# Patient Record
Sex: FEMALE | Race: WHITE | Employment: PART TIME | URBAN - METROPOLITAN AREA
[De-identification: names, ages, dates, MRNs, and addresses within clinical notes are randomized per-mention and may not be internally consistent; named-entity substitution may affect disease eponyms.]

---

## 2017-09-15 ENCOUNTER — HOSPITAL ENCOUNTER (OUTPATIENT)
Dept: MAMMOGRAPHY | Age: 42
Discharge: HOME | End: 2017-09-15
Attending: SPECIALIST
Payer: COMMERCIAL

## 2017-09-15 DIAGNOSIS — Z12.31 VISIT FOR SCREENING MAMMOGRAM: ICD-10-CM

## 2017-09-15 PROCEDURE — 77067 SCR MAMMO BI INCL CAD: CPT

## 2018-11-07 ENCOUNTER — HOSPITAL ENCOUNTER (OUTPATIENT)
Dept: MAMMOGRAPHY | Age: 43
Discharge: HOME | End: 2018-11-07
Attending: SPECIALIST
Payer: COMMERCIAL

## 2018-11-07 DIAGNOSIS — Z12.39 BREAST SCREENING: ICD-10-CM

## 2018-11-07 PROCEDURE — 77063 BREAST TOMOSYNTHESIS BI: CPT

## 2020-01-09 ENCOUNTER — HOSPITAL ENCOUNTER (OUTPATIENT)
Dept: MAMMOGRAPHY | Age: 45
Discharge: HOME | End: 2020-01-09
Attending: SPECIALIST
Payer: COMMERCIAL

## 2020-01-09 DIAGNOSIS — Z12.31 VISIT FOR SCREENING MAMMOGRAM: ICD-10-CM

## 2020-01-09 PROCEDURE — 77063 BREAST TOMOSYNTHESIS BI: CPT

## 2020-01-10 ENCOUNTER — HOSPITAL ENCOUNTER (OUTPATIENT)
Dept: MAMMOGRAPHY | Age: 45
Discharge: HOME | End: 2020-01-10
Attending: SPECIALIST
Payer: COMMERCIAL

## 2020-01-10 DIAGNOSIS — R92.8 ABNORMAL MAMMOGRAM OF LEFT BREAST: ICD-10-CM

## 2020-01-10 PROCEDURE — 76642 ULTRASOUND BREAST LIMITED: CPT

## 2021-01-08 ENCOUNTER — TRANSCRIBE ORDER (OUTPATIENT)
Dept: SCHEDULING | Age: 46
End: 2021-01-08

## 2021-01-08 DIAGNOSIS — Z12.31 VISIT FOR SCREENING MAMMOGRAM: Primary | ICD-10-CM

## 2021-02-05 ENCOUNTER — HOSPITAL ENCOUNTER (OUTPATIENT)
Dept: MAMMOGRAPHY | Age: 46
Discharge: HOME | End: 2021-02-05
Attending: SPECIALIST
Payer: COMMERCIAL

## 2021-02-05 DIAGNOSIS — Z12.31 VISIT FOR SCREENING MAMMOGRAM: ICD-10-CM

## 2021-02-05 PROCEDURE — 77063 BREAST TOMOSYNTHESIS BI: CPT

## 2022-02-07 ENCOUNTER — TRANSCRIBE ORDER (OUTPATIENT)
Dept: SCHEDULING | Age: 47
End: 2022-02-07

## 2022-02-07 DIAGNOSIS — Z12.31 VISIT FOR SCREENING MAMMOGRAM: Primary | ICD-10-CM

## 2022-03-24 ENCOUNTER — HOSPITAL ENCOUNTER (OUTPATIENT)
Dept: MAMMOGRAPHY | Age: 47
Discharge: HOME | End: 2022-03-24
Attending: SPECIALIST
Payer: COMMERCIAL

## 2022-03-24 DIAGNOSIS — Z12.31 VISIT FOR SCREENING MAMMOGRAM: ICD-10-CM

## 2022-03-24 PROCEDURE — 77063 BREAST TOMOSYNTHESIS BI: CPT

## 2023-01-30 ENCOUNTER — TRANSCRIBE ORDER (OUTPATIENT)
Dept: SCHEDULING | Age: 48
End: 2023-01-30

## 2023-01-30 DIAGNOSIS — Z12.31 SCREENING MAMMOGRAM FOR HIGH-RISK PATIENT: Primary | ICD-10-CM

## 2023-02-02 ENCOUNTER — OFFICE VISIT (OUTPATIENT)
Dept: ORTHOPEDIC SURGERY | Age: 48
End: 2023-02-02
Payer: COMMERCIAL

## 2023-02-02 VITALS — BODY MASS INDEX: 21.98 KG/M2 | HEIGHT: 68 IN | WEIGHT: 145 LBS

## 2023-02-02 DIAGNOSIS — M47.816 LUMBAR SPONDYLOSIS: ICD-10-CM

## 2023-02-02 DIAGNOSIS — M54.50 ACUTE RIGHT-SIDED LOW BACK PAIN WITHOUT SCIATICA: Primary | ICD-10-CM

## 2023-02-02 DIAGNOSIS — M51.36 BULGE OF LUMBAR DISC WITHOUT MYELOPATHY: ICD-10-CM

## 2023-02-02 RX ORDER — DIAZEPAM 5 MG/1
5 TABLET ORAL AS NEEDED
Qty: 2 TABLET | Refills: 0 | Status: SHIPPED | OUTPATIENT
Start: 2023-02-02

## 2023-02-02 NOTE — PROGRESS NOTES
Suzan Allen (: 1975) is a 50 y.o. female, patient, here for evaluation of the following chief complaint(s):  LOW BACK PAIN       ASSESSMENT/PLAN:    Below is the assessment and plan developed based on review of pertinent history, physical exam, labs, studies, and medications. Have discussed the patient's diagnosis and radiographic findings at length and have answered all patient questions to her satisfaction. Have sent a prescription for Valium electronically to the patient's preferred pharmacy. Have advised the patient to continue home exercise program of core strengthening as taught to her by outpatient physical therapy. Given the patient's ongoing pain despite conservative management have referred the patient for MRI. Will plan on seeing the patient back for reevaluation and further treatment recommendations once imaging results are available for review. The patient understands and agrees to the treatment plan as outlined above. 1. Acute right-sided low back pain without sciatica  -     XR SPINE LUMB 2 OR 3 V; Future  -     diazePAM (VALIUM) 5 mg tablet; Take 1 Tablet by mouth as needed for Anxiety. Max Daily Amount: 480 mg. Take 1 tablet 60 minutes prior to procedure. Take 1 tablet 30 minutes prior to procedure if needed for anxiety. Indications: anxious, Normal, Disp-2 Tablet, R-0Supervising physician: Michael Jeffrey MD [id number]  2. Bulge of lumbar disc without myelopathy  -     diazePAM (VALIUM) 5 mg tablet; Take 1 Tablet by mouth as needed for Anxiety. Max Daily Amount: 480 mg. Take 1 tablet 60 minutes prior to procedure. Take 1 tablet 30 minutes prior to procedure if needed for anxiety. Indications: anxious, Normal, Disp-2 Tablet, R-0Supervising physician: Michael Jeffrey MD [id number]  3. Lumbar spondylosis      No follow-ups on file.       SUBJECTIVE/OBJECTIVE:    Suzan Bean (: 1975) is a 50 y.o. female  who is a new patient who presents for evaluation of lumbar back pain has been ongoing issue for nearly 2 years. Patient states she initially started experiencing lumbar back pain while on vacation in April 2021. She states there was no event but she did take a long bike ride. Patient describes bilateral lower lumbar back pain right greater than left. Denies radiation of symptoms to the lower extremities. Patient has had physician directed home exercise program, chiropractic manipulation for about a year and had 2 months of outpatient physical therapy with little lasting benefit. Patient states that walking tends to improve her back pain.'s are worse with prolonged sitting and bending as well as first thing in the morning. Patient has taken Advil, Aleve and Tylenol on an as-needed basis without benefit. States her pain in the office today is rated as a 1/10. Patient states first thing in the morning and at times her pain is severe. Denies lower extremity numbness, tingling or weakness. Denies saddle paresthesias bowel or bladder incontinence. Pain Assessment  2/2/2023   Location of Pain Back   Location Modifiers Right;Posterior   Severity of Pain 2   Quality of Pain Aching   Aggravating Factors Bending;Stretching;Straightening;Exercise;Kneeling;Squatting;Standing;Stairs   Limiting Behavior Yes   Relieving Factors Exercises   Result of Injury No         Imaging:    AP and lateral view radiographs of the lumbar spine obtained 2/27/2022 as well as flexion and extension view radiographs of the lumbar spine obtained today were reviewed and demonstrate good preservation of vertebral body height and intervertebral disc height with the exception of L5-S1 where there is moderate-severe intervertebral disc height loss. There is mild-moderate spondylosis at L4-5 and L5-S1.       Allergies   Allergen Reactions    Penicillins Rash     \"Cillin meds give me rash\"       Current Outpatient Medications   Medication Sig    diazePAM (VALIUM) 5 mg tablet Take 1 Tablet by mouth as needed for Anxiety. Max Daily Amount: 480 mg. Take 1 tablet 60 minutes prior to procedure. Take 1 tablet 30 minutes prior to procedure if needed for anxiety. Indications: anxious     No current facility-administered medications for this visit. No past medical history on file. Past Surgical History:   Procedure Laterality Date    HX OTHER SURGICAL      fistula       Family History   Problem Relation Age of Onset    Breast Cancer Mother 47        Social History     Tobacco Use    Smoking status: Never   Vaping Use    Vaping Use: Never used   Substance Use Topics    Alcohol use: Yes    Drug use: Never        Review of Systems   Musculoskeletal:  Positive for back pain. All other systems reviewed and are negative. Vitals:  Ht 5' 8\" (1.727 m)   Wt 145 lb (65.8 kg)   BMI 22.05 kg/m²    Body mass index is 22.05 kg/m². Ortho Exam   Physical Exam     Neurologic  Sensory  Light Touch - Intact - Globally. Overall Assessment of Muscle Strength and Tone reveals  Lower Extremities - Right Iliopsoas - 5/5. Left Iliopsoas - 5/5. Right Tibialis Anterior - 5/5. Left Tibialis Anterior - 5/5. Right quadriceps-5/5. Left quadriceps 5/5. Right hamstring 5/5. Left hamstring 5/5. Right Gastroc-Soleus - 5/5. Left Gastroc-Soleus - 5/5. Right EHL - 5/5. Left EHL - 5/5. General Assessment of Reflexes  Right Hand - Carias's sign is negative in the right hand. Left Hand - Carias's sign is negative in the left hand. Right Ankle - Clonus is not present. Left Ankle - Clonus is not present. Reflexes (Dermatomes)  2/2 Normal - Left Achilles (L5-S2), Left Knee (L2-4), Right Achilles (L5-S2) and Right Knee (L2-4). Musculoskeletal  Global Assessment  Examination of related systems reveals - well-developed, well-nourished, in no acute distress, alert and oriented x 3 and normal coordination. Gait and Station - normal gait and station and normal posture.   Spine/Ribs/Pelvis  Cervical Spine - Evaluation of related systems reveals - no lymphadenopathy and neurovascularly intact bilaterally. Inspection and Palpation - Tenderness -none. Thoracic (Dorsal) Spine - Examination of the thoracic spine reveals - no tenderness over thoracic vertebrae, no pain, normal sensation and normal thoracic spine movements. Lumbosacral Spine - Examination of the lumbosacral spine reveals - no known fractures or deformities. Inspection and Palpation - Tenderness - moderate. Assessment of pain reveals the following findings - The pain is characterized as -mild. Location - pain refers to lumbosacral region bilaterally. ROM - Trunk Extension - 20 degrees. Lumbar Spine Flexion - 50 degrees. Lateral lumbar bending-30 degrees bilaterally. Lumbosacral Spine - Functional Testing - Babinski Test negative, Straight Leg Raising Test negative. An electronic signature was used to authenticate this note.   -- Alejandro Zaidi PA-C

## 2023-02-13 ENCOUNTER — TELEPHONE (OUTPATIENT)
Dept: ORTHOPEDIC SURGERY | Age: 48
End: 2023-02-13

## 2023-02-13 DIAGNOSIS — M54.50 ACUTE RIGHT-SIDED LOW BACK PAIN WITHOUT SCIATICA: Primary | ICD-10-CM

## 2023-02-13 DIAGNOSIS — M47.816 LUMBAR SPONDYLOSIS: ICD-10-CM

## 2023-02-13 DIAGNOSIS — M51.36 BULGE OF LUMBAR DISC WITHOUT MYELOPATHY: ICD-10-CM

## 2023-02-27 ENCOUNTER — PATIENT MESSAGE (OUTPATIENT)
Dept: ORTHOPEDIC SURGERY | Age: 48
End: 2023-02-27

## 2023-02-28 NOTE — TELEPHONE ENCOUNTER
From: Bib Dockery  To: Annie Mcmahon MD  Sent: 2/27/2023 3:30 PM EST  Subject: MRI     When I spoke to my insurance company about my MRI, they indicated that there was no preauthorization listed and it could be an issue with my appointment. They suggested I contact you. Are you able to confirm for me if that needs to be done?     Thank you,    Milo Moore

## 2023-03-20 ENCOUNTER — TELEPHONE (OUTPATIENT)
Dept: ORTHOPEDIC SURGERY | Age: 48
End: 2023-03-20

## 2023-03-20 DIAGNOSIS — M47.816 LUMBAR SPONDYLOSIS: ICD-10-CM

## 2023-03-20 DIAGNOSIS — M51.36 BULGE OF LUMBAR DISC WITHOUT MYELOPATHY: ICD-10-CM

## 2023-03-20 DIAGNOSIS — M54.50 ACUTE RIGHT-SIDED LOW BACK PAIN WITHOUT SCIATICA: Primary | ICD-10-CM

## 2023-03-20 NOTE — TELEPHONE ENCOUNTER
Need new order for MRI L Spine @ [facility] imaging   New order faxed to [phone].

## 2023-03-30 ENCOUNTER — HOSPITAL ENCOUNTER (OUTPATIENT)
Dept: MAMMOGRAPHY | Age: 48
Discharge: HOME | End: 2023-03-30
Attending: SPECIALIST
Payer: COMMERCIAL

## 2023-03-30 DIAGNOSIS — Z12.31 SCREENING MAMMOGRAM FOR HIGH-RISK PATIENT: ICD-10-CM

## 2023-03-30 PROCEDURE — 77063 BREAST TOMOSYNTHESIS BI: CPT

## 2023-05-19 RX ORDER — DIAZEPAM 5 MG/1
5 TABLET ORAL PRN
COMMUNITY
Start: 2023-02-02

## 2024-03-04 ENCOUNTER — TRANSCRIBE ORDERS (OUTPATIENT)
Facility: HOSPITAL | Age: 49
End: 2024-03-04

## 2024-03-04 DIAGNOSIS — Z12.31 VISIT FOR SCREENING MAMMOGRAM: Primary | ICD-10-CM

## 2024-04-29 ENCOUNTER — HOSPITAL ENCOUNTER (OUTPATIENT)
Facility: HOSPITAL | Age: 49
Discharge: HOME | End: 2024-05-02
Attending: SPECIALIST
Payer: COMMERCIAL

## 2024-04-29 DIAGNOSIS — Z12.31 VISIT FOR SCREENING MAMMOGRAM: ICD-10-CM

## 2024-04-29 PROCEDURE — 77063 BREAST TOMOSYNTHESIS BI: CPT

## 2025-04-03 ENCOUNTER — TRANSCRIBE ORDERS (OUTPATIENT)
Facility: HOSPITAL | Age: 50
End: 2025-04-03

## 2025-04-03 DIAGNOSIS — Z12.31 VISIT FOR SCREENING MAMMOGRAM: Primary | ICD-10-CM

## 2025-05-05 ENCOUNTER — HOSPITAL ENCOUNTER (OUTPATIENT)
Facility: HOSPITAL | Age: 50
Discharge: HOME | End: 2025-05-08
Attending: SPECIALIST
Payer: COMMERCIAL

## 2025-05-05 DIAGNOSIS — Z12.31 VISIT FOR SCREENING MAMMOGRAM: ICD-10-CM

## 2025-05-05 PROCEDURE — 77063 BREAST TOMOSYNTHESIS BI: CPT
